# Patient Record
Sex: MALE | Race: WHITE
[De-identification: names, ages, dates, MRNs, and addresses within clinical notes are randomized per-mention and may not be internally consistent; named-entity substitution may affect disease eponyms.]

---

## 2017-03-12 VITALS
DIASTOLIC BLOOD PRESSURE: 86 MMHG | SYSTOLIC BLOOD PRESSURE: 133 MMHG | DIASTOLIC BLOOD PRESSURE: 86 MMHG | SYSTOLIC BLOOD PRESSURE: 133 MMHG

## 2017-07-24 ENCOUNTER — HOSPITAL ENCOUNTER (OUTPATIENT)
Dept: HOSPITAL 61 - RAD | Age: 42
Discharge: HOME | End: 2017-07-24
Payer: COMMERCIAL

## 2017-07-24 VITALS
DIASTOLIC BLOOD PRESSURE: 92 MMHG | DIASTOLIC BLOOD PRESSURE: 92 MMHG | DIASTOLIC BLOOD PRESSURE: 92 MMHG | SYSTOLIC BLOOD PRESSURE: 134 MMHG | DIASTOLIC BLOOD PRESSURE: 92 MMHG | SYSTOLIC BLOOD PRESSURE: 134 MMHG | SYSTOLIC BLOOD PRESSURE: 134 MMHG | SYSTOLIC BLOOD PRESSURE: 134 MMHG | SYSTOLIC BLOOD PRESSURE: 134 MMHG | DIASTOLIC BLOOD PRESSURE: 92 MMHG | DIASTOLIC BLOOD PRESSURE: 92 MMHG | DIASTOLIC BLOOD PRESSURE: 92 MMHG | SYSTOLIC BLOOD PRESSURE: 134 MMHG | DIASTOLIC BLOOD PRESSURE: 92 MMHG | SYSTOLIC BLOOD PRESSURE: 134 MMHG | SYSTOLIC BLOOD PRESSURE: 134 MMHG

## 2017-07-24 VITALS — WEIGHT: 300 LBS | BODY MASS INDEX: 40.63 KG/M2 | HEIGHT: 72 IN
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
## 2017-07-24 DIAGNOSIS — S39.92XA: Primary | ICD-10-CM
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
## 2017-07-24 DIAGNOSIS — R20.0: ICD-10-CM
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
## 2017-07-24 DIAGNOSIS — M47.897: ICD-10-CM

## 2017-07-24 PROCEDURE — 72270 MYELOGPHY 2/> SPINE REGIONS: CPT

## 2017-07-24 PROCEDURE — 72129 CT CHEST SPINE W/DYE: CPT

## 2017-07-24 PROCEDURE — 72132 CT LUMBAR SPINE W/DYE: CPT
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
## 2021-05-12 ENCOUNTER — HOSPITAL ENCOUNTER (OUTPATIENT)
Dept: HOSPITAL 61 - KCIC MRI | Age: 46
End: 2021-05-12
Payer: MEDICAID

## 2021-05-12 DIAGNOSIS — M48.04: Primary | ICD-10-CM

## 2021-05-12 DIAGNOSIS — G90.521: ICD-10-CM

## 2021-05-12 PROCEDURE — 72148 MRI LUMBAR SPINE W/O DYE: CPT

## 2021-05-12 PROCEDURE — 72146 MRI CHEST SPINE W/O DYE: CPT

## 2021-05-12 NOTE — [facility]
MRI THORACIC SPINE WO, MR LUMBAR SPINE WO -[id number]



History: COMPLEX REGIONAL PAIN SYNDROME TYPE 1, New onset of right lower leg pain and numbness after 
the shingles.



Technique: Multiplanar, multi sequential MR imaging was performed of the thoracic and lumbar spine wi
thout intravenous contrast.



Comparison: None



FINDINGS: 

THORACIC SPINE:

Normal vertebral body height and alignment. No acute fracture. Intraosseous hemangioma at T3 vertebra
l body. Normal spinal cord caliber and morphology with no significant abnormality. Intervertebral dis
c heights are preserved. No canal stenosis or neuroforaminal narrowing.



LUMBAR SPINE:

Normal vertebral body height and alignment. No acute fracture. Conus terminates at the normal locatio
n. No evidence of nerve root clumping. Modic type 2 degeneration at L5-S1. Interosseous hemangioma at
 S2 vertebral body.



L1-L2:  No canal or neuroforaminal narrowing.



L2-L3:  No canal or neuroforaminal narrowing.



L3-L4:  No canal or neuroforaminal narrowing.



L4-L5:  No canal or neuroforaminal narrowing.



L5-S1:  Mild diffuse disc bulge. No canal or significant neuroforaminal narrowing.



Impression: 

Unremarkable thoracic and lumbar spine MRI.



Electronically signed by: Sonny Langford MD (5/12/2021 10:37 AM) [id number]

## 2021-05-12 NOTE — [facility]
MRI THORACIC SPINE WO, MR LUMBAR SPINE WO -[id number]



History: COMPLEX REGIONAL PAIN SYNDROME TYPE 1, New onset of right lower leg pain and numbness after 
the shingles.



Technique: Multiplanar, multi sequential MR imaging was performed of the thoracic and lumbar spine wi
thout intravenous contrast.



Comparison: None



FINDINGS: 

THORACIC SPINE:

Normal vertebral body height and alignment. No acute fracture. Intraosseous hemangioma at T3 vertebra
l body. Normal spinal cord caliber and morphology with no significant abnormality. Intervertebral dis
c heights are preserved. No canal stenosis or neuroforaminal narrowing.



LUMBAR SPINE:

Normal vertebral body height and alignment. No acute fracture. Conus terminates at the normal locatio
n. No evidence of nerve root clumping. Modic type 2 degeneration at L5-S1. Interosseous hemangioma at
 S2 vertebral body.



L1-L2:  No canal or neuroforaminal narrowing.



L2-L3:  No canal or neuroforaminal narrowing.



L3-L4:  No canal or neuroforaminal narrowing.



L4-L5:  No canal or neuroforaminal narrowing.



L5-S1:  Mild diffuse disc bulge. No canal or significant neuroforaminal narrowing.



Impression: 

Unremarkable thoracic and lumbar spine MRI.



Electronically signed by: Sonny Langford MD (5/12/2021 10:37 AM) [id number]

## 2021-05-21 ENCOUNTER — HOSPITAL ENCOUNTER (OUTPATIENT)
Dept: HOSPITAL 61 - PNCL | Age: 46
Discharge: HOME | End: 2021-05-21
Attending: ANESTHESIOLOGY
Payer: COMMERCIAL

## 2021-05-21 DIAGNOSIS — Z90.49: ICD-10-CM

## 2021-05-21 DIAGNOSIS — I10: ICD-10-CM

## 2021-05-21 DIAGNOSIS — M79.604: ICD-10-CM

## 2021-05-21 DIAGNOSIS — Z79.899: ICD-10-CM

## 2021-05-21 DIAGNOSIS — E66.9: ICD-10-CM

## 2021-05-21 DIAGNOSIS — M54.5: Primary | ICD-10-CM

## 2021-05-21 DIAGNOSIS — Z98.890: ICD-10-CM

## 2021-05-21 PROCEDURE — 99214 OFFICE O/P EST MOD 30 MIN: CPT

## 2021-05-21 PROCEDURE — G0463 HOSPITAL OUTPT CLINIC VISIT: HCPCS
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
## 2021-05-26 ENCOUNTER — HOSPITAL ENCOUNTER (OUTPATIENT)
Dept: HOSPITAL 61 - PNCL | Age: 46
Discharge: HOME | End: 2021-05-26
Attending: ANESTHESIOLOGY
Payer: COMMERCIAL

## 2021-05-26 DIAGNOSIS — Z98.890: ICD-10-CM

## 2021-05-26 DIAGNOSIS — M51.16: Primary | ICD-10-CM

## 2021-05-26 DIAGNOSIS — Z79.899: ICD-10-CM

## 2021-05-26 DIAGNOSIS — I10: ICD-10-CM

## 2021-05-26 PROCEDURE — 62323 NJX INTERLAMINAR LMBR/SAC: CPT

## 2021-05-26 NOTE — PDOC
Progress Note - Pain Clinic


Date of Service:


DOS:


DATE: 5/26/21 


TIME: 08:42





Diagnosis:


Dx:


Lumbar radiculopathy post herpetic neuralgia





History or Present Illness:


HPI:


46-year-old male returns follow-up status post initial evaluation 

preauthorization for lumbar epidural steroid injection.  Patient is obtained 

that authorization and would like to proceed.  Patient reports still significant

pain in the low back right lower extremity posterior gluteus posterior thigh 

posterior calf especially in the calf patient reports radiating tingling burning

cramping aching and sharp constant in the back and the leg can be severe with 

walking and standing patient reports is a 10 on scale 10 is worse over the past 

week 8 on average 7 its least is an 8 today.  Patient reports awakening from 

sleep occasionally with no new motor or sensory deficits no new bowel or bladder

incontinence.





Physical Exam:


VS:


Blood pressure is 159/108 pulse 75 respirations 18 temperature 98.2 F weight is

277 pounds


PE:


PHYSICAL EXAMINATION:





GENERAL: The patient is awake, alert, oriented, appropriate, very pleasant 

demeanor


HEENT: Shows normocephalic, atraumatic.  Extraocular movements are intact and 

symmetrical.  Oral cavity: Mucous membranes moist and pink. 


NECK: Shows anterior throat supple without palpable lymphadenopathy noted.  

Swallow reflex symmetrical.


CHEST: Shows normal on inspection.  Breath sounds are clear bilaterally.


HEART: Shows S1, S2 clear.  No murmurs auscultated.


ABDOMEN: Soft, nontender, nondistended, obese.  No palpable organomegaly is 

noted.  No rebound or guarding demonstrated.


BACK: Shows spine grossly in the midline.  Normal-appearing cervical lordotic 

curvature.  There is slightly increased thoracic kyphosis, some minor flattening

of the lumbar lordotic curvature.  Lumbar paraspinous muscles show symmetrical 

on inspection, on palpation shows some moderate tenderness diffusely throughout 

the upper, middle and lower distribution of the paraspinous muscles, but without

specific trigger points, without radiation of pain.  The patient has good 

rotational motion of the lumbar spine, both laterally as well as extension and 

flexion without significant difficulty.  No tenderness over the spinous 

processes, sacrum or sacroiliac regions.


EXTREMITIES: Lower extremities show deep tendon reflexes 2+ in the patellar and 

tendo calcaneus tendons.  Motor exam is 4 on a scale of 5 with right 

dorsiflexion, extension, quadriceps and hamstring flexion and 5/5 on the left.  

Peripheral pulses are 1+ posterior tibial.  No peripheral edema is noted 

bilaterally.  Lower extremities are warm and dry to touch, equal in color and 

appearance.


SKIN: Shows warm and dry, good turgor.  No edema.  No sores, rashes or bruising 

throughout.





Procedure:


Procedure:


Options discussed with patient.  Patient chart was reviewed his his current 

medication regimen updated current review of systems updated today as well.  We 

will proceed with a lumbar epidural steroid injection stable fluoroscopic nikko

nce.  Risks were discussed including but not limited to: Bleeding, infection, 

possibility of epidural hematoma and subsequent neurological compromise, dural 

puncture, headaches, spinal cord and/or nerve damage, side effects of steroid 

medication, and poor results regarding pain control.  Patient understands and 

wished to proceed.  Patient return to the clinic in approximately 2 weeks for 

follow-up, was counseled as to return appointment activity level and side 

effects to be aware of.





Medication Injected:


Med Injected:


Procedure is lumbar epidural steroid injection under local anesthetic using 

sterile prep and drape at the L5-S1 level using C-arm fluoroscopic guidance in 

both AP and lateral views medications injected is 120 mg Depo-Medrol +10mL 

preservative-free normal saline and 2 mL contrast- condition at discharge is 

stable patient tolerated procedure well had no complications.





Condition at Discharge:


Condition at Discharge:


Condition at discharge is stable, patient tolerated the procedure well and had 

no complications.











JERROD MARTÍNEZ MD               May 26, 2021 08:44

## 2021-05-26 NOTE — PDOC4
PROCEDURE


Procedure


Patient was consented for lumbar epidural steroid injection.  Risks were dis

cussed including but not limited to: Bleeding, infection, possibility of 

epidural hematoma and subsequent neurological compromise, dural puncture, 

headaches, spinal cord and/or nerve damage, side effects of steroid medication, 

and poor results regarding pain control.  Patient understands and wished to 

proceed.


Procedure is lumbar epidural steroid injection under local anesthetic using 

sterile prep and drape at the L5-S1 level using C-arm fluoroscopic guidance in 

both AP and lateral views medications injected is 120 mg Depo-Medrol +10mL 

preservative-free normal saline and 2 mL contrast- condition at discharge is 

stable patient tolerated procedure well had no complications.











JERROD MARTÍNEZ MD               May 26, 2021 08:45

## 2021-06-10 ENCOUNTER — HOSPITAL ENCOUNTER (OUTPATIENT)
Dept: HOSPITAL 61 - PNCL | Age: 46
Discharge: HOME | End: 2021-06-10
Attending: ANESTHESIOLOGY
Payer: COMMERCIAL

## 2021-06-10 DIAGNOSIS — I10: ICD-10-CM

## 2021-06-10 DIAGNOSIS — B02.29: ICD-10-CM

## 2021-06-10 DIAGNOSIS — Z79.899: ICD-10-CM

## 2021-06-10 DIAGNOSIS — M54.16: Primary | ICD-10-CM

## 2021-06-10 PROCEDURE — G0463 HOSPITAL OUTPT CLINIC VISIT: HCPCS

## 2021-06-10 PROCEDURE — 99212 OFFICE O/P EST SF 10 MIN: CPT

## 2021-06-10 NOTE — PDOC
Progress Note - Pain Clinic


Date of Service:


DOS:


DATE: 6/10/21 


TIME: 09:12





Diagnosis:


Dx:


Lumbar radiculopathy


Postherpetic neuralgia





History or Present Illness:


HPI:


46-year-old male returns follow-up status post lumbar epidural steroid injection

x1.  Patient reports about 65% improvement initially over the first 2 weeks or 

so the pain is been returning now over the past few days in the low back right 

lower extremity still present and again status post shingles outbreak with 

postherpetic neuralgia as well as lumbar radiculopathy on the right lower 

extremity patient reports is a 9 on scale 10 is worst over the past week 7 on 

average 7 its least is a 7 today patient ports aching sharp burning cramping at 

night sometimes awakened from sleep with some cramping and muscle spasms in the 

right leg and low back but not most nights patient reports it can be constant 

severe with activity standing walking still with some weakness in the right 

lower extremity with ambulation but without any overt muscle loss or motor 

dysfunction.  Patient reports he has been increasing his distance walking doing 

household activities when working a little faster with his job as well as 

improved his posture with the right lower extremity but still significant pain 

and radiating pain in a radicular fashion following an L5-S1 dermatomal 

distribution on the right side.  Patient reports no new motor or sensory 

deficits no bowel or bladder incontinence.





Physical Exam:


VS:


Blood pressure is 149 pulse 70 respirations 16 temperature 98.2 F weight is 270

pounds


PE:


PHYSICAL EXAMINATION:





GENERAL: The patient is awake, alert, oriented, appropriate, very pleasant 

demeanor


HEENT: Shows normocephalic, atraumatic.  Extraocular movements are intact and 

symmetrical.  Oral cavity: Mucous membranes moist and pink.  Dentition is 

intact.


NECK: Shows anterior throat supple without palpable lymphadenopathy noted.  

Swallow reflex symmetrical.


CHEST: Shows normal on inspection.  Breath sounds are clear bilaterally.


HEART: Shows S1, S2 clear.  No murmurs auscultated.


ABDOMEN: Soft, nontender, nondistended, obese.  No palpable organomegaly is 

noted.  


BACK: Shows spine grossly in the midline.  Normal-appearing cervical lordotic 

curvature.  There is slightly increased thoracic kyphosis, some minor flattening

of the lumbar lordotic curvature.  Lumbar paraspinous muscles show symmetrical 

on inspection, on palpation shows some moderate tenderness diffusely throughout 

the upper, middle and lower distribution of the paraspinous muscles, but without

specific trigger points, without radiation of pain.  The patient has good 

rotational motion of the lumbar spine, both laterally as well as extension and 

flexion without significant difficulty.  


EXTREMITIES: Lower extremities show deep tendon reflexes through in the patellar

and tendo calcaneus tendons.  Motor exam is 4 on a scale of 5 with right 

dorsiflexion, extension, quadriceps and hamstring flexion and 5/5 on the left.  

Peripheral pulses are 1+ posterior tibial.  No peripheral edema is noted 

bilaterally.  Lower extremities are warm and dry to touch, equal in color and 

appearance.  Straight leg raise noted to be positive on the right about 45 

degrees, left is negative.


SKIN: Shows warm and dry, good turgor.  No edema.  No sores, rashes or bruising 

throughout.





Procedure:


Procedure:


Options were discussed with the patient.  Patient chart was reviewed his current

medication regimen updated current review of systems updated today as well.  We 

will preauthorize patient for second lumbar epidural steroid injections very 

well after the first injection about 65% improvement but now the pain returning 

in a radicular fashion following L5-S1 dermatomal distribution on the right.  

Patient will continue with stretching strength exercises also medication 

management we will also add Flexeril nightly for muscle spasms.  Patient was 

given instructions well side effects beware with the medication.  Patient to 

follow-up in approximately 2 weeks as scheduled we will plan on second lumbar 

epidural steroid injection translaminar approach L5-S1 level.





Medication Injected:


Med Injected:


None





Condition at Discharge:


Condition at Discharge:


Condition at discharge stable.











JERROD MARTÍNEZ MD               Chris 10, 2021 09:15

## 2021-06-23 ENCOUNTER — HOSPITAL ENCOUNTER (OUTPATIENT)
Dept: HOSPITAL 61 - PNCL | Age: 46
Discharge: HOME | End: 2021-06-23
Attending: ANESTHESIOLOGY
Payer: COMMERCIAL

## 2021-06-23 DIAGNOSIS — B02.29: ICD-10-CM

## 2021-06-23 DIAGNOSIS — I10: ICD-10-CM

## 2021-06-23 DIAGNOSIS — Z79.899: ICD-10-CM

## 2021-06-23 DIAGNOSIS — M54.16: Primary | ICD-10-CM

## 2021-06-23 PROCEDURE — G0463 HOSPITAL OUTPT CLINIC VISIT: HCPCS

## 2021-06-23 PROCEDURE — 99212 OFFICE O/P EST SF 10 MIN: CPT

## 2021-06-23 NOTE — PDOC
Progress Note - Pain Clinic


Date of Service:


DOS:


DATE: 6/23/21 


TIME: 09:12





Diagnosis:


Dx:


Lumbar radiculopathy with postherpetic neuralgia





History or Present Illness:


HPI:


46-year-old male returns for follow-up status post lumbar epidural steroid 

injection x1 without 65% improvement for the first few weeks after the injection

patient reports pain returning on the right lower extremity but better with 

gabapentin pain in the posterior gluteus thigh and into the calf especially in 

the foot patient reports is aching sharp burning cramping radiating severe 

off-and-on intensity but always present reports it feels that it is fatigued so 

he is run a marathon with his right leg.  Patient reports pain is a 7 on scale 

10 is worst average and least of the past week and is a 7 today patient reports 

no new motor or sensory deficits gabapentin does help is not limping as much as 

he had since his last injection as well.  Patient reports pain is returning now 

and is about 35% improvement at this time.  Patient reports no new motor or 

sensory deficits no new bowel or bladder incontinence or other complaints.





Physical Exam:


VS:


Blood pressure is 149/101 pulse 71 respirations 20 temperature 98.2 F weight is

278 pounds


PE:


PHYSICAL EXAMINATION:





GENERAL: The patient is awake, alert, oriented, appropriate, very pleasant 

demeanor


HEENT: Shows normocephalic, atraumatic.  Extraocular movements are intact and 

symmetrical.  


NECK: Shows anterior throat supple without palpable lymphadenopathy noted.  

Swallow reflex symmetrical.


ABDOMEN: Soft, nontender, nondistended, obese.  


BACK: Shows spine grossly in the midline.  Normal-appearing cervical lordotic 

curvature.  There is slightly increased thoracic kyphosis, some minor flattening

of the lumbar lordotic curvature.  Lumbar paraspinous muscles show symmetrical 

on inspection, on palpation shows some moderate tenderness diffusely throughout 

the upper, middle and lower distribution of the paraspinous muscles, but without

specific trigger points, without radiation of pain.  The patient has good 

rotational motion of the lumbar spine, both laterally as well as extension and 

flexion without significant difficulty. 


EXTREMITIES: Lower extremities show deep tendon reflexes 2+ in the patellar and 

tendo calcaneus tendons.  Motor exam is 4 on a scale of 5 with right 

dorsiflexion, extension, quadriceps and hamstring flexion and 5/5 on the left.  

Peripheral pulses are 1+ posterior tibial.  No peripheral edema is noted 

bilaterally.  Lower extremities are warm and dry.


SKIN: Shows warm and dry, good turgor.  No edema.  No sores, rashes or bruising 

throughout.





Procedure:


Procedure:


Options discussed with the patient.  Patient's old chart was reviewed his 

current medication regimen updated current review of systems updated today as 

well.  We will preauthorize patient for a second lumbar epidural steroid 

injection with fluoroscopic guidance.  Patient with clinical radiculopathy L5-S1

dermatomal distribution with postherpetic neuralgia and previous shingles 

outbreak in the same dermatomal distribution.  Once approved, we will proceed 

with a translaminar L5-S1 level lumbar epidural steroid injection.  Meantime 

patient continue with stretching strength exercises as tolerated as well as 

gabapentin as currently.





Medication Injected:


Med Injected:


None





Condition at Discharge:


Condition at Discharge:


Condition at discharge is stable.











JERROD MARTÍNEZ MD               Jun 23, 2021 09:15

## 2021-06-24 ENCOUNTER — HOSPITAL ENCOUNTER (OUTPATIENT)
Dept: HOSPITAL 61 - PNCL | Age: 46
Discharge: HOME | End: 2021-06-24
Attending: ANESTHESIOLOGY
Payer: COMMERCIAL

## 2021-06-24 DIAGNOSIS — B02.29: ICD-10-CM

## 2021-06-24 DIAGNOSIS — I10: ICD-10-CM

## 2021-06-24 DIAGNOSIS — Z79.899: ICD-10-CM

## 2021-06-24 DIAGNOSIS — M54.16: Primary | ICD-10-CM

## 2021-06-24 PROCEDURE — 62323 NJX INTERLAMINAR LMBR/SAC: CPT

## 2021-06-24 NOTE — PDOC4
Procedure Note:


Procedure Note:


Patient was consented for lumbar epidural steroid injection.  Risks were 

discussed including but not limited to: Bleeding, infection, possibility of 

epidural hematoma and subsequent neurological compromise, dural puncture, 

headaches, spinal cord and/or nerve damage, side effects of steroid medication, 

and poor results regarding pain control.  Patient understands and wished to 

proceed.


Procedure is lumbar epidural steroid injection under local anesthetic using 

sterile prep and drape at the L5-S1 level using C-arm fluoroscopic guidance in 

both AP and lateral views medications injected is 120 mg Depo-Medrol +10mL 

preservative-free normal saline and 2 mL contrast- condition at discharge is 

stable patient tolerated procedure well had no complications.











JERROD MARTÍNEZ MD               Jun 24, 2021 15:47

## 2021-06-24 NOTE — PDOC
Progress Note - Pain Clinic


Date of Service:


DOS:


DATE: 6/24/21 


TIME: 15:43





Diagnosis:


Dx:


Lumbar radiculopathy with lumbar postherpetic neuralgia





History or Present Illness:


HPI:


46-year-old male returns for follow-up status post lumbar epidural steroid 

injection x1 with 65% improvement after the postherpetic neuralgia in the L5-S1 

dermatomal distribution on the right leg.  Patient reports still some pain 

radiating the posterior gluteus posterior thigh posterior calf and foot but much

better than it was patient reports it still noticeable with walking standing 

changing positions better with resting or sitting patient reports it wakes him 

from sleep about 2-3 times a night patient reports a 7 on scale 10 is worst 

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 

cramping radiating can be constant severe with activity standing and walking 

specially when he is at work.  Patient reports no new rashes, no new motor or 

sensory deficits no new bowel or bladder incontinence or other complaints.





Physical Exam:


VS:


Blood pressure is 135/98 pulse 77 respirations 18 temperature 98.3 F weight is 

2 8 0 pounds


PE:


PHYSICAL EXAMINATION:





GENERAL: The patient is awake, alert, oriented, appropriate, very pleasant in 

demeanor.


HEENT: Shows normocephalic, atraumatic.  Extraocular movements are intact and 

symmetrical.  Oral cavity: Mucous membranes moist and pink.  Dentition is 

intact.


NECK: Shows anterior throat supple without palpable lymphadenopathy noted.  

Swallow reflex symmetrical.


CHEST: Shows normal on inspection.  Breath sounds are clear bilaterally.


HEART: Shows S1, S2 clear.  No murmurs auscultated.


ABDOMEN: Soft, nontender, nondistended.  No palpable organomegaly is noted.  No 

rebound or guarding demonstrated.


BACK: Shows spine grossly in the midline.  Normal-appearing cervical lordotic 

curvature.  There is slightly increased thoracic kyphosis, some minor flattening

of the lumbar lordotic curvature.  Lumbar paraspinous muscles show symmetrical 

on inspection, on palpation shows some moderate tenderness diffusely throughout 

the upper, middle and lower distribution of the paraspinous muscles, but without

specific trigger points, without radiation of pain.  


EXTREMITIES: Lower extremities show deep tendon reflexes 2+ in the patellar and 

tendo calcaneus tendons.  Motor exam is 4 on a scale of 5 with right 

dorsiflexion, extension, quadriceps and hamstring flexion and 5/5 on the left.  

Peripheral pulses are 1 posterior tibial.  No peripheral edema is noted juan m

aterally.  Lower extremities are warm and dry to touch, equal in color and 

appearance.  No new rashes or discoloration noted in the left or right lower 

extremities.


SKIN: Shows warm and dry, good turgor.  No edema.  No sores, rashes or bruising 

throughout.





Procedure:


Procedure:


Options were discussed with the patient.  Patient's old chart was used with 

current medication regimen updated current review of systems updated today as 

well.  We will proceed with a second in a series lumbar epidural steroid 

injection with fluoroscopic guidance.  Risks were discussed including but not 

limited to: Bleeding, infection, possibility of epidural hematoma and subsequent

neurological compromise, dural puncture, headaches, spinal cord and/or nerve 

damage, side effects of steroid medication, and poor results regarding pain 

control.  Patient understands and wished to proceed.  Patient will return to the

clinic in approximate 2 weeks for follow-up, was counseled as to return 

appointment activity level and side effects be aware of.





Medication Injected:


Med Injected:


Procedure is lumbar epidural steroid injection under local anesthetic using 

sterile prep and drape at the L5-S1 level using C-arm fluoroscopic guidance in 

both AP and lateral views medications injected is 120 mg Depo-Medrol +10mL 

preservative-free normal saline and 2 mL contrast- condition at discharge is 

stable patient tolerated procedure well had no complications.





Condition at Discharge:


Condition at Discharge:


Condition at discharge is stable, patient tolerated the procedure well and had 

no complications.











JERROD MARTÍNEZ MD               Jun 24, 2021 15:47

## 2021-09-09 ENCOUNTER — HOSPITAL ENCOUNTER (OUTPATIENT)
Dept: HOSPITAL 63 - RAD | Age: 46
End: 2021-09-09
Attending: NURSE PRACTITIONER
Payer: COMMERCIAL

## 2021-09-09 DIAGNOSIS — J06.9: Primary | ICD-10-CM

## 2021-09-09 PROCEDURE — 71046 X-RAY EXAM CHEST 2 VIEWS: CPT

## 2021-09-09 NOTE — RAD
INDICATION: Reason: WHEEZING, UPPER RESP. INFECTION / Spl. Instructions:  / History: 



COMPARISON: None.



FINDINGS:



2 view of chest obtained.

Cardiac silhouette is unremarkable.

No focal airspace consolidation or pulmonary edema.





IMPRESSION:



*  No focal airspace consolidation or edema.



Electronically signed by: Chai Garrison MD (9/9/2021 10:49 AM) [id number]

## 2021-11-29 ENCOUNTER — HOSPITAL ENCOUNTER (OUTPATIENT)
Dept: HOSPITAL 63 - RAD | Age: 46
End: 2021-11-29
Attending: PHYSICIAN ASSISTANT
Payer: MEDICAID

## 2021-11-29 DIAGNOSIS — M25.552: Primary | ICD-10-CM

## 2021-11-29 PROCEDURE — 73502 X-RAY EXAM HIP UNI 2-3 VIEWS: CPT

## 2021-11-29 NOTE — RAD
EXAMINATION: XR BILATERAL HIP (WITH OR WITHOUT PELVIS) LEFT 2 VIEWS  



CLINICAL HISTORY: Left hip pain



TECHNIQUE: XR BILATERAL HIP (WITH OR WITHOUT PELVIS) LEFT 2 VIEWS



COMPARISON: None





FINDINGS/

IMPRESSION:  



Joint spaces and alignment on the left hip maintained. Right hip unremarkable on limited evaluation. 
Pubic symphysis and SI joints maintained. No acute fracture.



Electronically signed by: Lupillo Garcia DO (11/29/2021 5:18 PM) [id number]

## 2021-12-01 ENCOUNTER — HOSPITAL ENCOUNTER (EMERGENCY)
Dept: HOSPITAL 63 - ER | Age: 46
Discharge: HOME | End: 2021-12-01
Payer: MEDICAID

## 2021-12-01 VITALS — WEIGHT: 270.51 LBS | BODY MASS INDEX: 36.64 KG/M2 | HEIGHT: 72 IN

## 2021-12-01 VITALS — DIASTOLIC BLOOD PRESSURE: 96 MMHG | SYSTOLIC BLOOD PRESSURE: 143 MMHG

## 2021-12-01 DIAGNOSIS — M54.59: ICD-10-CM

## 2021-12-01 DIAGNOSIS — F41.9: ICD-10-CM

## 2021-12-01 DIAGNOSIS — G89.29: Primary | ICD-10-CM

## 2021-12-01 DIAGNOSIS — Z88.5: ICD-10-CM

## 2021-12-01 DIAGNOSIS — I10: ICD-10-CM

## 2021-12-01 DIAGNOSIS — M25.552: ICD-10-CM

## 2021-12-01 PROCEDURE — 99283 EMERGENCY DEPT VISIT LOW MDM: CPT
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
## 2021-12-15 ENCOUNTER — HOSPITAL ENCOUNTER (OUTPATIENT)
Dept: HOSPITAL 61 - PNCL | Age: 46
Discharge: HOME | End: 2021-12-15
Attending: ANESTHESIOLOGY
Payer: MEDICAID

## 2021-12-15 DIAGNOSIS — M51.16: Primary | ICD-10-CM

## 2021-12-15 DIAGNOSIS — Z79.899: ICD-10-CM

## 2021-12-15 DIAGNOSIS — I10: ICD-10-CM

## 2021-12-15 PROCEDURE — 62323 NJX INTERLAMINAR LMBR/SAC: CPT

## 2021-12-15 NOTE — PDOC4
Procedure Note:


ICD 10 Code:


ICD 10 Code:


M54.17


M51.87





Procedure Note:


Patient was consented for lumbar epidural steroid injection with fluoroscopic 

guidance.  Risks were discussed including but not limited to: Bleeding, 

infection, possibility of epidural hematoma and subsequent neurological 

compromise, dural puncture, headaches, spinal cord and/or nerve damage, side 

effects of steroid medication, and poor results regarding pain control.  Patient

understands and wished to proceed.


Procedure is lumbar epidural steroid injection under local anesthetic using 

sterile prep and drape at the L5-S1 level using C-arm fluoroscopic guidance in 

both AP and lateral views medications injected is 120 mg Depo-Medrol +10mL 

preservative-free normal saline and 2 mL contrast- condition at discharge is 

stable patient tolerated procedure well had no complications.











JERROD MARTÍNEZ MD               Dec 15, 2021 10:06

## 2021-12-15 NOTE — PDOC
Progress Note - Pain Clinic


Date of Service:


DOS:


DATE: 12/15/21 


TIME: 10:03





Diagnosis:


Dx:


Lumbar radiculopathy with lumbar degenerative disc disease





History or Present Illness:


HPI:


46-year-old male returns with complaint of pain in the left low back hip 

posterior gluteus posterior thigh posterior calf to the ankle for approximately 

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 

duties which has been on his feet much more as he is a  at a 

local [employer] home patient reports that the pain is increasing in the low 

back and left leg he was seen initially last summer for some shingles issues but

this is different the pain is radiating the posterior gluteus posterior thigh 

lateral thigh lateral calf posterior thigh and into the posterior calf and foot 

on the left side with walking standing changing positions patient reports 

awakening from sleep at night especially lays on his left side he has been to 

the emergency room twice for this has had oral steroids which helped significan

tly but wear off soon as they are done patient reports pain is a 10 on scale 10 

is worse over the past week 8 on average 6 its least is a 6 today patient ports 

is aching sharp burning cramping in the back shooting and radiating times in the

left lower extremity patient reports no motor loss but significant fatigability 

and stumbling with the left leg frequently throughout his working day.  Patient 

reports no bowel or bladder incontinence.





Physical Exam:


VS:


Blood pressure is 147/82 pulse 83 respirations 18 temperature 98.9 F height 6 

foot weight is 268 pounds


PE:


PHYSICAL EXAMINATION:





GENERAL: The patient is awake, alert, oriented, appropriate, very pleasant in 

demeanor


HEENT: Shows normocephalic, atraumatic.  Extraocular movements are intact and 

symmetrical.  Patient wearing eyeglasses.  Oral cavity: Mucous membranes moist 

and pink.  Dentition is intact.


NECK: Shows anterior throat supple without palpable lymphadenopathy noted.  

Swallow reflex symmetrical.


CHEST: Shows normal on inspection.  Breath sounds are clear bilaterally, distant

but no rales or rhonchi.


HEART: Shows S1, S2 clear.  No murmurs auscultated.


ABDOMEN: Soft, nontender, nondistended.  No palpable organomegaly is noted.  


BACK: Shows spine grossly in the midline.  Normal-appearing cervical lordotic 

curvature.  There is slightly increased thoracic kyphosis, some minor flattening

of the lumbar lordotic curvature.  Lumbar paraspinous muscles show symmetrical 

on inspection, on palpation shows some moderate tenderness diffusely throughout 

the upper, middle and lower distribution of the paraspinous muscles without spec

ific trigger points, without radiation of pain.  The patient has good rotational

motion of the lumbar spine, both laterally as well as extension and flexion 

without significant difficulty.


EXTREMITIES: Lower extremities show deep tendon reflexes 2+ in the patellar and 

tendo calcaneus tendons.  Motor exam is 5 on a scale of 5 with right 

dorsiflexion, extension, quadriceps and hamstring flexion and 4/5 on the left.  

Peripheral pulses are 1+ posterior tibial.  No peripheral edema is noted 

bilaterally.  Lower extremities are warm and dry to touch, equal in color and 

appearance.


SKIN: Shows warm and dry, good turgor.  No edema.  No sores, rashes or bruising 

throughout.





Procedure:


Procedure:


Options were discussed with the patient.  Patient chart was reviewed his current

medication regimen updated current review of systems updated today as well.  We 

will proceed with a lumbar epidural steroid injection today with fluoroscopic 

guidance risks were discussed including but not limited to: Bleeding, infection,

possibility of epidural hematoma and subsequent neurological compromise, dural 

puncture, headaches, spinal cord and/or nerve damage, side effects of steroid 

medication, and poor results regarding pain control.  Patient understands and 

wished to proceed.  Patient will return to clinic in approximately 2 weeks for 

follow-up, was counseled as return appointment, activity level, and side effect 

to be aware of.





Medication Injected:


Med Injected:


[id number] is lumbar epidural steroid injection under local anesthetic using

sterile prep and drape at the L5-S1 level using C-arm fluoroscopic guidance in 

both AP and lateral views medications injected is 120 mg Depo-Medrol +10mL 

preservative-free normal saline and 2 mL contrast- condition at discharge is 

stable patient tolerated procedure well had no complications.





Condition at Discharge:


Condition at Discharge:


Condition at discharge is stable, patient tolerated procedure well and had no 

complications.











JERROD MARTÍNEZ MD               Dec 15, 2021 10:06